# Patient Record
Sex: FEMALE | Race: WHITE | Employment: FULL TIME | URBAN - METROPOLITAN AREA
[De-identification: names, ages, dates, MRNs, and addresses within clinical notes are randomized per-mention and may not be internally consistent; named-entity substitution may affect disease eponyms.]

---

## 2019-10-25 ENCOUNTER — APPOINTMENT (OUTPATIENT)
Dept: CT IMAGING | Age: 44
End: 2019-10-25
Payer: COMMERCIAL

## 2019-10-25 ENCOUNTER — HOSPITAL ENCOUNTER (EMERGENCY)
Age: 44
Discharge: HOME | End: 2019-10-25
Attending: EMERGENCY MEDICINE
Payer: COMMERCIAL

## 2019-10-25 VITALS
OXYGEN SATURATION: 99 % | SYSTOLIC BLOOD PRESSURE: 141 MMHG | DIASTOLIC BLOOD PRESSURE: 87 MMHG | WEIGHT: 217 LBS | HEIGHT: 67 IN | BODY MASS INDEX: 34.06 KG/M2 | HEART RATE: 92 BPM | TEMPERATURE: 98.5 F | RESPIRATION RATE: 18 BRPM

## 2019-10-25 DIAGNOSIS — R51.9 ACUTE NONINTRACTABLE HEADACHE, UNSPECIFIED HEADACHE TYPE: Primary | ICD-10-CM

## 2019-10-25 LAB
ANION GAP SERPL CALCULATED.3IONS-SCNC: 10 MMOL/L (ref 4–16)
BASOPHILS ABSOLUTE: 0 K/CU MM
BASOPHILS RELATIVE PERCENT: 0.3 % (ref 0–1)
BUN BLDV-MCNC: 8 MG/DL (ref 6–23)
CALCIUM SERPL-MCNC: 9.5 MG/DL (ref 8.3–10.6)
CHLORIDE BLD-SCNC: 103 MMOL/L (ref 99–110)
CO2: 23 MMOL/L (ref 21–32)
CREAT SERPL-MCNC: 0.8 MG/DL (ref 0.6–1.1)
DIFFERENTIAL TYPE: ABNORMAL
EKG ATRIAL RATE: 96 BPM
EKG DIAGNOSIS: NORMAL
EKG P AXIS: 27 DEGREES
EKG P-R INTERVAL: 128 MS
EKG Q-T INTERVAL: 364 MS
EKG QRS DURATION: 80 MS
EKG QTC CALCULATION (BAZETT): 459 MS
EKG R AXIS: 59 DEGREES
EKG T AXIS: 39 DEGREES
EKG VENTRICULAR RATE: 96 BPM
EOSINOPHILS ABSOLUTE: 0.3 K/CU MM
EOSINOPHILS RELATIVE PERCENT: 4.9 % (ref 0–3)
GFR AFRICAN AMERICAN: >60 ML/MIN/1.73M2
GFR NON-AFRICAN AMERICAN: >60 ML/MIN/1.73M2
GLUCOSE BLD-MCNC: 123 MG/DL (ref 70–99)
GLUCOSE BLD-MCNC: 131 MG/DL (ref 70–99)
HCT VFR BLD CALC: 47.9 % (ref 37–47)
HEMOGLOBIN: 15.3 GM/DL (ref 12.5–16)
IMMATURE NEUTROPHIL %: 0.3 % (ref 0–0.43)
LYMPHOCYTES ABSOLUTE: 2.1 K/CU MM
LYMPHOCYTES RELATIVE PERCENT: 32.2 % (ref 24–44)
MCH RBC QN AUTO: 30.4 PG (ref 27–31)
MCHC RBC AUTO-ENTMCNC: 31.9 % (ref 32–36)
MCV RBC AUTO: 95 FL (ref 78–100)
MONOCYTES ABSOLUTE: 0.4 K/CU MM
MONOCYTES RELATIVE PERCENT: 6.6 % (ref 0–4)
NUCLEATED RBC %: 0 %
PDW BLD-RTO: 12.6 % (ref 11.7–14.9)
PLATELET # BLD: 297 K/CU MM (ref 140–440)
PMV BLD AUTO: 9.6 FL (ref 7.5–11.1)
POTASSIUM SERPL-SCNC: 3.9 MMOL/L (ref 3.5–5.1)
RBC # BLD: 5.04 M/CU MM (ref 4.2–5.4)
SEGMENTED NEUTROPHILS ABSOLUTE COUNT: 3.7 K/CU MM
SEGMENTED NEUTROPHILS RELATIVE PERCENT: 55.7 % (ref 36–66)
SODIUM BLD-SCNC: 136 MMOL/L (ref 135–145)
TOTAL IMMATURE NEUTOROPHIL: 0.02 K/CU MM
TOTAL NUCLEATED RBC: 0 K/CU MM
TROPONIN T: <0.01 NG/ML
WBC # BLD: 6.6 K/CU MM (ref 4–10.5)

## 2019-10-25 PROCEDURE — 80048 BASIC METABOLIC PNL TOTAL CA: CPT

## 2019-10-25 PROCEDURE — 96374 THER/PROPH/DIAG INJ IV PUSH: CPT

## 2019-10-25 PROCEDURE — 99284 EMERGENCY DEPT VISIT MOD MDM: CPT

## 2019-10-25 PROCEDURE — 6360000002 HC RX W HCPCS: Performed by: EMERGENCY MEDICINE

## 2019-10-25 PROCEDURE — 84484 ASSAY OF TROPONIN QUANT: CPT

## 2019-10-25 PROCEDURE — 6370000000 HC RX 637 (ALT 250 FOR IP): Performed by: EMERGENCY MEDICINE

## 2019-10-25 PROCEDURE — 93010 ELECTROCARDIOGRAM REPORT: CPT | Performed by: INTERNAL MEDICINE

## 2019-10-25 PROCEDURE — 93005 ELECTROCARDIOGRAM TRACING: CPT | Performed by: EMERGENCY MEDICINE

## 2019-10-25 PROCEDURE — 70450 CT HEAD/BRAIN W/O DYE: CPT

## 2019-10-25 PROCEDURE — 85025 COMPLETE CBC W/AUTO DIFF WBC: CPT

## 2019-10-25 PROCEDURE — 82962 GLUCOSE BLOOD TEST: CPT

## 2019-10-25 RX ORDER — DIPHENHYDRAMINE HCL 25 MG
25 TABLET ORAL ONCE
Status: COMPLETED | OUTPATIENT
Start: 2019-10-25 | End: 2019-10-25

## 2019-10-25 RX ORDER — METOCLOPRAMIDE 10 MG/1
10 TABLET ORAL 4 TIMES DAILY PRN
Qty: 20 TABLET | Refills: 0 | Status: SHIPPED | OUTPATIENT
Start: 2019-10-25 | End: 2021-02-24

## 2019-10-25 RX ORDER — PROCHLORPERAZINE EDISYLATE 5 MG/ML
10 INJECTION INTRAMUSCULAR; INTRAVENOUS ONCE
Status: COMPLETED | OUTPATIENT
Start: 2019-10-25 | End: 2019-10-25

## 2019-10-25 RX ADMIN — DIPHENHYDRAMINE HYDROCHLORIDE 25 MG: 25 TABLET ORAL at 11:06

## 2019-10-25 RX ADMIN — PROCHLORPERAZINE EDISYLATE 10 MG: 5 INJECTION INTRAMUSCULAR; INTRAVENOUS at 11:06

## 2019-10-25 ASSESSMENT — PAIN SCALES - GENERAL
PAINLEVEL_OUTOF10: 10
PAINLEVEL_OUTOF10: 5

## 2019-10-25 ASSESSMENT — PAIN DESCRIPTION - DESCRIPTORS: DESCRIPTORS: HEADACHE

## 2019-10-25 ASSESSMENT — PAIN DESCRIPTION - LOCATION: LOCATION: HEAD

## 2019-10-25 ASSESSMENT — PAIN DESCRIPTION - PAIN TYPE: TYPE: ACUTE PAIN

## 2023-11-22 ENCOUNTER — OFFICE VISIT (OUTPATIENT)
Age: 48
End: 2023-11-22
Payer: COMMERCIAL

## 2023-11-22 VITALS
SYSTOLIC BLOOD PRESSURE: 126 MMHG | BODY MASS INDEX: 35.68 KG/M2 | WEIGHT: 222 LBS | DIASTOLIC BLOOD PRESSURE: 87 MMHG | HEIGHT: 66 IN

## 2023-11-22 DIAGNOSIS — N95.1 HOT FLASH, MENOPAUSAL: Primary | ICD-10-CM

## 2023-11-22 PROCEDURE — 36415 COLL VENOUS BLD VENIPUNCTURE: CPT | Performed by: STUDENT IN AN ORGANIZED HEALTH CARE EDUCATION/TRAINING PROGRAM

## 2023-11-22 PROCEDURE — 99203 OFFICE O/P NEW LOW 30 MIN: CPT | Performed by: STUDENT IN AN ORGANIZED HEALTH CARE EDUCATION/TRAINING PROGRAM

## 2023-11-22 RX ORDER — SERTRALINE HYDROCHLORIDE 100 MG/1
150 TABLET, FILM COATED ORAL DAILY
Qty: 30 TABLET | Refills: 3 | Status: SHIPPED | OUTPATIENT
Start: 2023-11-22

## 2023-11-22 SDOH — ECONOMIC STABILITY: HOUSING INSECURITY
IN THE LAST 12 MONTHS, WAS THERE A TIME WHEN YOU DID NOT HAVE A STEADY PLACE TO SLEEP OR SLEPT IN A SHELTER (INCLUDING NOW)?: NO

## 2023-11-22 SDOH — ECONOMIC STABILITY: INCOME INSECURITY: HOW HARD IS IT FOR YOU TO PAY FOR THE VERY BASICS LIKE FOOD, HOUSING, MEDICAL CARE, AND HEATING?: NOT HARD AT ALL

## 2023-11-22 SDOH — ECONOMIC STABILITY: FOOD INSECURITY: WITHIN THE PAST 12 MONTHS, THE FOOD YOU BOUGHT JUST DIDN'T LAST AND YOU DIDN'T HAVE MONEY TO GET MORE.: NEVER TRUE

## 2023-11-22 SDOH — ECONOMIC STABILITY: FOOD INSECURITY: WITHIN THE PAST 12 MONTHS, YOU WORRIED THAT YOUR FOOD WOULD RUN OUT BEFORE YOU GOT MONEY TO BUY MORE.: NEVER TRUE

## 2023-11-22 ASSESSMENT — PATIENT HEALTH QUESTIONNAIRE - PHQ9
SUM OF ALL RESPONSES TO PHQ QUESTIONS 1-9: 0
1. LITTLE INTEREST OR PLEASURE IN DOING THINGS: 0
SUM OF ALL RESPONSES TO PHQ9 QUESTIONS 1 & 2: 0
2. FEELING DOWN, DEPRESSED OR HOPELESS: 0
SUM OF ALL RESPONSES TO PHQ QUESTIONS 1-9: 0

## 2023-11-22 NOTE — PROGRESS NOTES
Department of Obstetrics and Gynecology  Office Visit  Name:  Shruthi Ruiz   CSN: [id number]    : 1975   Age: 57 y.o. Chief Complaint   Patient presents with    New Patient     New pt, no menses with depo provera. Pt has question regarding depo. Is sexually active, last pap smear- [facility] x 1-2 years ago. Breast u/s end of October normal. No c/o. SUBJECTIVE:  Shruthi Ruiz is a 57 y.o. Macel Reese who presents for consultation regarding menopausal symptoms and Depo provera. Patient reports hot flashes at night. She follows with a PCP and presents for evaluation as these symptoms have worsened. Does not have periods on Depo, has occasional spotting. Reports she has been on Depo about 20 years. GYN HX:    Menses:   No LMP recorded (lmp unknown). Patient has had an injection. Contraception:  Depo injection. Denies any problems with that method, wishes to continue. Infections:  No history of STDs or PID. Sexual history:  Currently is sexually active. Patient's medications, allergies, past medical, surgical, social and family histories were reviewed and updated as appropriate.     Past Medical History:   Diagnosis Date    Diverticulitis     Endometriosis     IBS (irritable bowel syndrome)        Past Surgical History:   Procedure Laterality Date     SECTION      CHOLECYSTECTOMY      THYROIDECTOMY         Social History     Socioeconomic History    Marital status:      Spouse name: Not on file    Number of children: Not on file    Years of education: Not on file    Highest education level: Not on file   Occupational History    Not on file   Tobacco Use    Smoking status: Never    Smokeless tobacco: Never   Vaping Use    Vaping Use: Former   Substance and Sexual Activity    Alcohol use: Yes    Drug use: No    Sexual activity: Yes     Partners: Male   Other Topics Concern    Not on file   Social History Narrative    Not on file     Social Determinants of Health

## 2023-11-23 LAB — FSH SERPL-ACNC: 21.1 MIU/ML
